# Patient Record
Sex: MALE | Race: WHITE | NOT HISPANIC OR LATINO | Employment: STUDENT | URBAN - METROPOLITAN AREA
[De-identification: names, ages, dates, MRNs, and addresses within clinical notes are randomized per-mention and may not be internally consistent; named-entity substitution may affect disease eponyms.]

---
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
## 2020-02-05 ENCOUNTER — COMMUNICATION - HEALTHEAST (OUTPATIENT)
Dept: TELEHEALTH | Facility: CLINIC | Age: 20
End: 2020-02-05

## 2020-02-05 ENCOUNTER — OFFICE VISIT - HEALTHEAST (OUTPATIENT)
Dept: FAMILY MEDICINE | Facility: CLINIC | Age: 20
End: 2020-02-05

## 2020-02-05 DIAGNOSIS — K13.79 MOUTH SORES: ICD-10-CM

## 2020-02-05 DIAGNOSIS — Z23 NEED FOR VACCINATION: ICD-10-CM

## 2020-02-05 RX ORDER — METHYLPHENIDATE HYDROCHLORIDE 30 MG/1
30 CAPSULE, EXTENDED RELEASE ORAL PRN
Status: SHIPPED | COMMUNITY
Start: 2019-05-20

## 2020-02-05 RX ORDER — METHYLPHENIDATE HYDROCHLORIDE 10 MG/1
10 TABLET ORAL PRN
Status: SHIPPED | COMMUNITY
Start: 2020-01-07 | End: 2021-07-15 | Stop reason: DRUGHIGH

## 2020-02-05 ASSESSMENT — MIFFLIN-ST. JEOR: SCORE: 1752.66

## 2020-02-18 ENCOUNTER — OFFICE VISIT - HEALTHEAST (OUTPATIENT)
Dept: FAMILY MEDICINE | Facility: CLINIC | Age: 20
End: 2020-02-18

## 2020-02-18 DIAGNOSIS — Z13.220 SCREENING FOR HYPERLIPIDEMIA: ICD-10-CM

## 2020-02-18 DIAGNOSIS — R63.4 RECENT UNINTENTIONAL WEIGHT LOSS OVER SEVERAL MONTHS: ICD-10-CM

## 2020-02-18 DIAGNOSIS — F90.9 ATTENTION DEFICIT HYPERACTIVITY DISORDER (ADHD), UNSPECIFIED ADHD TYPE: ICD-10-CM

## 2020-02-18 DIAGNOSIS — Z00.121 ENCOUNTER FOR ROUTINE CHILD HEALTH EXAMINATION WITH ABNORMAL FINDINGS: ICD-10-CM

## 2020-02-18 LAB
ALBUMIN SERPL-MCNC: 5 G/DL (ref 3.5–5)
ALP SERPL-CCNC: 122 U/L (ref 45–120)
ALT SERPL W P-5'-P-CCNC: 33 U/L (ref 0–45)
ANION GAP SERPL CALCULATED.3IONS-SCNC: 11 MMOL/L (ref 5–18)
AST SERPL W P-5'-P-CCNC: 23 U/L (ref 0–40)
BASOPHILS # BLD AUTO: 0.1 THOU/UL (ref 0–0.2)
BASOPHILS NFR BLD AUTO: 1 % (ref 0–2)
BILIRUB SERPL-MCNC: 0.8 MG/DL (ref 0–1)
BUN SERPL-MCNC: 18 MG/DL (ref 8–22)
CALCIUM SERPL-MCNC: 10.3 MG/DL (ref 8.5–10.5)
CHLORIDE BLD-SCNC: 104 MMOL/L (ref 98–107)
CHOLEST SERPL-MCNC: 129 MG/DL
CO2 SERPL-SCNC: 27 MMOL/L (ref 22–31)
CREAT SERPL-MCNC: 0.81 MG/DL (ref 0.7–1.3)
EOSINOPHIL # BLD AUTO: 0.2 THOU/UL (ref 0–0.4)
EOSINOPHIL NFR BLD AUTO: 3 % (ref 0–6)
ERYTHROCYTE [DISTWIDTH] IN BLOOD BY AUTOMATED COUNT: 12.1 % (ref 11–14.5)
FASTING STATUS PATIENT QL REPORTED: YES
GFR SERPL CREATININE-BSD FRML MDRD: >60 ML/MIN/1.73M2
GLUCOSE BLD-MCNC: 91 MG/DL (ref 70–125)
HCT VFR BLD AUTO: 43 % (ref 40–54)
HDLC SERPL-MCNC: 49 MG/DL
HGB BLD-MCNC: 14.9 G/DL (ref 14–18)
LDLC SERPL CALC-MCNC: 69 MG/DL
LYMPHOCYTES # BLD AUTO: 1.9 THOU/UL (ref 0.8–4.4)
LYMPHOCYTES NFR BLD AUTO: 20 % (ref 20–40)
MCH RBC QN AUTO: 31.6 PG (ref 27–34)
MCHC RBC AUTO-ENTMCNC: 34.7 G/DL (ref 32–36)
MCV RBC AUTO: 91 FL (ref 80–100)
MONOCYTES # BLD AUTO: 0.8 THOU/UL (ref 0–0.9)
MONOCYTES NFR BLD AUTO: 9 % (ref 2–10)
NEUTROPHILS # BLD AUTO: 6.2 THOU/UL (ref 2–7.7)
NEUTROPHILS NFR BLD AUTO: 67 % (ref 50–70)
PLATELET # BLD AUTO: 202 THOU/UL (ref 140–440)
PMV BLD AUTO: 9.8 FL (ref 8.5–12.5)
POTASSIUM BLD-SCNC: 5 MMOL/L (ref 3.5–5)
PROT SERPL-MCNC: 8.1 G/DL (ref 6–8)
RBC # BLD AUTO: 4.71 MILL/UL (ref 4.4–6.2)
SODIUM SERPL-SCNC: 142 MMOL/L (ref 136–145)
TRIGL SERPL-MCNC: 54 MG/DL
TSH SERPL DL<=0.005 MIU/L-ACNC: 1.11 UIU/ML (ref 0.3–5)
WBC: 9.2 THOU/UL (ref 4–11)

## 2020-02-18 ASSESSMENT — MIFFLIN-ST. JEOR: SCORE: 1743.82

## 2020-02-19 ENCOUNTER — COMMUNICATION - HEALTHEAST (OUTPATIENT)
Dept: FAMILY MEDICINE | Facility: CLINIC | Age: 20
End: 2020-02-19

## 2020-02-19 LAB — 25(OH)D3 SERPL-MCNC: 116 NG/ML (ref 30–80)

## 2020-05-18 ENCOUNTER — VIRTUAL VISIT (OUTPATIENT)
Dept: FAMILY MEDICINE | Facility: OTHER | Age: 20
End: 2020-05-18

## 2020-05-18 NOTE — PROGRESS NOTES
"Date: 2020 14:30:30  Clinician: Miguel Carson  Clinician NPI: [id number]  Patient: Garry Nourse  Patient : 2000  Patient Address: [facility] [address], [place] [address]  Patient Phone: [phone]  Visit Protocol: URI  Patient Summary:  Garry is a 19 year old ( : 2000 ) male who initiated a Visit for COVID-19 (Coronavirus) evaluation and screening. When asked the question \"Please sign me up to receive news, health information and promotions from [email or web address].\", Garry responded \"No\".    Garry states his symptoms started 1-2 days ago.   His symptoms consist of diarrhea, a sore throat, nasal congestion, rhinitis, a headache, and malaise. He is experiencing difficulty breathing due to nasal congestion but he is not short of breath.   Symptom details     Nasal secretions: The color of his mucus is clear.    Sore throat: Garry reports having mild throat pain (1-3 on a 10 point pain scale), does not have exudate on his tonsils, and can swallow liquids. He is not sure if the lymph nodes in his neck are enlarged. A rash has not appeared on the skin since the sore throat started.     Headache: He states the headache is mild (1-3 on a 10 point pain scale).      Garry denies having nausea, teeth pain, ageusia, facial pain or pressure, chills, wheezing, fever, cough, vomiting, ear pain, myalgias, and anosmia. He also denies having recent facial or sinus surgery in the past 60 days and taking antibiotic medication for the symptoms.   Precipitating events  Within the past week, Garry has not been exposed to someone with strep throat.   Pertinent COVID-19 (Coronavirus) information  In the past 14 days, Garry has not worked in a congregate living setting.   He does not work or volunteer as healthcare worker or a  and does not work or volunteer in a healthcare facility.   Garry also has not lived in a congregate living setting in the past 14 days. He does not live with a healthcare worker. "   Garry has had a close contact with a laboratory-confirmed COVID-19 patient within 14 days of symptom onset. Additional information about contact with COVID-19 (Coronavirus) patient as reported by the patient (free text): It was my mom who I saw and had contact with who was tested the next day and was positive   Pertinent medical history  Garry does not need a return to work/school note.   Weight: 155 lbs   Garry smokes or uses smokeless tobacco.   Weight: 155 lbs    MEDICATIONS: Intuniv ER oral, Ritalin oral, ALLERGIES: NKDA  Clinician Response:  Dear Garry,   Dear [patient_first_name  Your symptoms show that you may have coronavirus (COVID-19). This illness can cause fever, cough and trouble breathing. Many people get a mild case and get better on their own. Some people can get very sick.  Will I be tested for COVID-19?  Not all patients are tested for COVID-19. If you need to be tested, your care team will let you know. You may request testing if:   You are very ill. For example, you're on chemotherapy, dialysis or home hospice care. (Contact your specialty clinic or program.)   You live in a nursing home or other long-term care facility. (Talk to your nurse manager or medical director.)   You're a health care worker. (Contact your employee health office.)   How can I protect others?  Without a test, we can't know for sure that you have COVID-19. For safety, it's very important to follow these rules.  First, stay home and away from others (self-isolate) until:   You've had no fever---and no medicine that reduces fever---for 3 full days (72 hours). And...    Your other symptoms have gotten better. For example, your cough or breathing has improved. And...   At least 10 days have passed since your symptoms started.   During this time:   Stay in your own room (and use your own bathroom), if you can.   Stay away from others in your home. No hugging, kissing or shaking hands.   Don't let anyone visit.   Don't go  "to work, school or anywhere else.    Clean \"high touch\" surfaces often (doorknobs, counters, handles, etc.). Use a household cleaning spray or wipes.   Cover your mouth and nose with a mask, tissue or washcloth to avoid spreading germs.   Wash your hands and face often. Use soap and water.   How can I take care of myself?   1.Get lots of rest. Drink extra fluids (unless a doctor has told you not to).                  2.Take Tylenol (acetaminophen) for fever or pain. If you have liver or kidney problems, ask your family doctor if it's okay to take Tylenol.        Adults can take either:    650 mg (two 325 mg pills) every 4 to 6 hours, or...   1,000 mg (two 500 mg pills) every 8 hours as needed.    Note: Don't take more than 3,000 mg in one day. Acetaminophen is found in many medicines (both prescribed and over-the-counter medicines). Read all labels to be sure you don't take too much.    For children, check the Tylenol bottle for the right dose. The dose is based on the child's age or weight.   3.If you have other health problems (like cancer, heart failure, an organ transplant or severe kidney disease): Call your specialty clinic if you don't feel better in the next 2 days.       4.Know when to call 911: If your breathing is so bad that it keeps you from doing normal activities, call 911 or go to the emergency room. Tell them that you've been staying home and may have COVID-19.       5.Sign up for [employer]. We know it's scary to hear that you might have COVID-19. We want to track your symptoms to make sure you're okay over the next 2 weeks. Please look for an email from [employer]---this is a free, online program that we'll use to keep in touch. To sign up, follow the link in the email. Learn more at [email or web address].   Where can I get more information?  For more about COVID-19 and caring for yourself at home, please visit the CDC website at " https://www.cdc.gov/coronavirus/2019-ncov/about/steps-when-sick.html.  To learn about care at [facility], please visit [email or web address]         If you'd like to be part of a COVID-19 clinical trial (research study) at the [facility], go to [email or web address] for details.     Diagnosis: Acute upper respiratory infection, unspecified  Diagnosis ICD: J06.9

## 2020-06-23 ENCOUNTER — COMMUNICATION - HEALTHEAST (OUTPATIENT)
Dept: SCHEDULING | Facility: CLINIC | Age: 20
End: 2020-06-23

## 2020-06-23 DIAGNOSIS — Z20.822 COVID-19 RULED OUT: ICD-10-CM

## 2020-06-24 ENCOUNTER — AMBULATORY - HEALTHEAST (OUTPATIENT)
Dept: LAB | Facility: CLINIC | Age: 20
End: 2020-06-24

## 2020-06-24 DIAGNOSIS — Z20.822 COVID-19 RULED OUT: ICD-10-CM

## 2020-06-25 LAB — COVID-19 ANTIBODY IGG: NEGATIVE

## 2020-06-27 ENCOUNTER — COMMUNICATION - HEALTHEAST (OUTPATIENT)
Dept: SCHEDULING | Facility: CLINIC | Age: 20
End: 2020-06-27

## 2021-05-19 ENCOUNTER — OFFICE VISIT - HEALTHEAST (OUTPATIENT)
Dept: FAMILY MEDICINE | Facility: CLINIC | Age: 21
End: 2021-05-19

## 2021-05-19 DIAGNOSIS — Z11.1 SCREENING EXAMINATION FOR PULMONARY TUBERCULOSIS: ICD-10-CM

## 2021-05-19 ASSESSMENT — MIFFLIN-ST. JEOR: SCORE: 1831.31

## 2021-05-26 ENCOUNTER — COMMUNICATION - HEALTHEAST (OUTPATIENT)
Dept: SCHEDULING | Facility: CLINIC | Age: 21
End: 2021-05-26

## 2021-05-27 ENCOUNTER — COMMUNICATION - HEALTHEAST (OUTPATIENT)
Dept: ADMINISTRATIVE | Facility: CLINIC | Age: 21
End: 2021-05-27

## 2021-05-27 VITALS
WEIGHT: 169.19 LBS | RESPIRATION RATE: 16 BRPM | SYSTOLIC BLOOD PRESSURE: 112 MMHG | DIASTOLIC BLOOD PRESSURE: 64 MMHG | HEIGHT: 73 IN | BODY MASS INDEX: 22.42 KG/M2 | TEMPERATURE: 97.2 F | HEART RATE: 80 BPM

## 2021-05-28 ASSESSMENT — ASTHMA QUESTIONNAIRES: ACT_TOTALSCORE: 25

## 2021-05-29 ENCOUNTER — HEALTH MAINTENANCE LETTER (OUTPATIENT)
Age: 21
End: 2021-05-29

## 2021-05-31 VITALS — BODY MASS INDEX: 27.83 KG/M2 | WEIGHT: 210 LBS | HEIGHT: 73 IN

## 2021-06-04 VITALS
HEIGHT: 73 IN | TEMPERATURE: 98.3 F | DIASTOLIC BLOOD PRESSURE: 60 MMHG | BODY MASS INDEX: 19.87 KG/M2 | SYSTOLIC BLOOD PRESSURE: 132 MMHG | WEIGHT: 149.9 LBS | HEART RATE: 72 BPM

## 2021-06-04 VITALS
HEIGHT: 74 IN | HEART RATE: 76 BPM | RESPIRATION RATE: 16 BRPM | SYSTOLIC BLOOD PRESSURE: 138 MMHG | WEIGHT: 150.1 LBS | BODY MASS INDEX: 19.26 KG/M2 | DIASTOLIC BLOOD PRESSURE: 62 MMHG

## 2021-06-05 NOTE — PROGRESS NOTES
Assessment / Impression     1. Mouth sores     2. Need for vaccination  Influenza, Seasonal Quad, PF =/> 6months    HPV vaccine 9 valent 2 dose IM (if started before age 15)    Tdap vaccine,  8yo or older,  IM       Plan:     1.  Reviewed with patient that the sores appear to be healing.  We discussed techniques of distraction that continue to work for him such as chewing gum that he is going to try.  We discussed foods to avoid and over-the-counter remedies to help with discomfort.  If he has persistent symptoms he will contact his mental health provider to discuss cognitive behavioral therapies versus medication management if needed.  2.  Vaccines were updated today and he was encouraged to return for a well visit at his convenience.    Subjective:      HPI: Garry Redmond is a 19 y.o. male with open sores in his mouth is here today for evaluation.  This is an otherwise healthy 19-year-old male who reports he does have Tourette's syndrome for which she sees a mental health therapist at an outside health system.  He reports occasionally he will have episodes of lip biting and cheek biting.  He states it started last Thursday and he has had a couple of open sores in his mouth that he wanted evaluated to ensure they were not becoming infected.  He reports he has been trying to chew gum that has been helping with his symptoms.  He believes his lip biting is slowly improving and it has disappeared on its own after about a week or 2 in the past.  He denies any fevers or chills.  He does report some mouth discomfort but states that it is slowly improving as the biting is improving.  He has no other questions or concerns.  Of note patient does report that he had asthma as a young child and has had no symptoms in the past 2 decades.  He is not been on any medication or had any symptoms requiring any medical treatment for the past 2 decades.      Review of Systems  Pertinent items are noted in HPI.       Objective:     BP  "138/62 (Patient Site: Right Arm, Patient Position: Sitting, Cuff Size: Adult Regular)   Pulse 76   Resp 16   Ht 6' 1.5\" (1.867 m)   Wt 150 lb 1.6 oz (68.1 kg)   BMI 19.53 kg/m    Physical Examination: General appearance - alert, well appearing, and in no distress  Mouth: mucous membranes moist, patient has 2 small 3 mm sores on his upper oral cavities adjacent to his molars.  Both of them are showing no evidence of surrounding erythema or pus.  Patient also has two 3 mm, thickened, non-erythematous areas on his inner lip consistent with chronic biting of the inner lower lip.  Neck: supple, no significant adenopathy, no cervical adenopathy.  Chest: clear to auscultation, no wheezes, rales or rhonchi, symmetric air entry  Heart: normal rate, regular rhythm, normal S1, S2, no murmurs, rubs, clicks or gallops  Neurological: alert, oriented, normal speech, no focal findings or movement disorder noted.  No tics noted on exam today.  Psychiatric: Normal affect. Does not appear anxious or depressed.  "

## 2021-06-06 NOTE — PROGRESS NOTES
[facility] Well Child Check    ASSESSMENT & PLAN  Garry Redmond is a 19 y.o. who has normal growth and normal development.    Diagnoses and all orders for this visit:    Encounter for routine child health examination with abnormal findings    Recent unintentional weight loss over several months  -     Thyroid Cascade  -     Comprehensive Metabolic Panel  -     HM1(CBC and Differential)  -     Vitamin D, Total (25-Hydroxy)  -     HM1 (CBC with Diff)    Attention deficit hyperactivity disorder (ADHD), unspecified ADHD type    Screening for hyperlipidemia  -     Lipid Cascade FASTING      In general healthy well-child completed today however abnormal findings of unintentional weight loss I was able to go back and his documentation and see that he at one time was 210 pounds and has since lost about 60 pounds based on today's weight.  Unclear etiology of this as he overall has been feeling well and denies any weight loss efforts.  Could certainly be related to the Ritalin but his minimal use of this would not suggest this.  He only takes this 2 to 3 days/week he is followed by psychiatry every 6 months.  We will check the above labs to evaluate further.  Sent patient a my chart sign up for communication.  I encouraged him to keep a food journal to assess calories and calories out as well as activity.  Encouraged him to increase his intake of high-calorie healthy foods such as full fat yogurt, cottage cheese, eggs, avocados, and lean proteins.  He is in agreement this plan.    Return to clinic in 1 year for a Well Child Check or sooner as needed    IMMUNIZATIONS/LABS  No immunizations due today.  Hemoglobin: See results in chart.  Lipid Cascade: See results in chart.  see labs results in chart for workup of unintentional weight loss. .    REFERRALS  Dental:  Recommend routine dental care as appropriate.  Other:  No additional referrals were made at this time.    ANTICIPATORY GUIDANCE  I have reviewed age appropriate  anticipatory guidance.  Social:  Friends, Employment and Extracurricular Activities  Parenting:  Support and Confidential Health Care  Nutrition:  Junk Food, Dieting and Body Image  Play and Communication:  Hobbies, Creative Talents, Read Books and Media Violence Awareness  Health:  Self-image building, Drugs, Smoking, Alcohol and Self Testicular Exam  Safety:  Seat Belts  Sexuality:  Safe Sex and STD's    HEALTH HISTORY  Do you have any concerns that you'd like to discuss today?: weight loss and blood work request from mom   Has had about a 50lb weight loss over the last 18 months. He was off of ritalin for a summer and continued to lose weight. The weight loss was progressive. Eating patterns did not seem change. He was not skipping meals. Physical activity did not change. He typically snacks throughout the day and one meal per day.  Weight loss has been unintentional. For management of his mental health he sees a psychiatrist. At his last appointment it was about 6 months ago and has since lost about another 10lbs. He takes Ritalin 2-3 days per week.  11 months ago at his Psychiatrists office he weighed 176lbs.  Last documented WCC weight was on 6/14/17 weight was 210lbs.     He feels well and denies any symptoms of illness or feeling unwell.     Roomed by: [facility]    Refills needed? No    Do you have any forms that need to be filled out? No        Do you have any significant health concerns in your family history?: No  Family History   Problem Relation Age of Onset     Hypertension Mother      Diabetes Maternal Grandfather      Diabetes Paternal Grandmother      Diabetes Paternal Grandfather      Since your last visit, have there been any major changes in your family, such as a move, job change, separation, divorce, or death in the family?: No  Has a lack of transportation kept you from medical appointments?: No    Home  Who lives in your home?:  Dad dads girlfriend and GF'S daughter  Social History     Social  History Narrative     Not on file     Do you have any concerns about losing your housing?: No  Is your housing safe and comfortable?: Yes  Do you have any trouble with sleep?:  Yes:     Education  What school do you child attend?:  [employer]  What grade are you in?:  1st year college  How do you perform in school (grades, behavior, attention, homework?: Pt states going great loves it!     Eating  Do you eat regular meals including fruits and vegetables?:  no, 1 reg meal and snacks  What are you drinking (cow's milk, water, soda, juice, sports drinks, energy drinks, etc)?: water and soda  Have you been worried that you don't have enough food?: No  Do you have concerns about your body or appearance?:  No    Activities  Do you have friends?:  yes  Do you get at least one hour of physical activity per day?:  no  How many hours a day are you in front of a screen other than for schoolwork (computer, TV, phone)?:  A lot for school work ect  What do you do for exercise?:  nothing  Do you have interest/participate in community activities/volunteers/school sports?:  no    VISION/HEARING  Vision: Patient is already followed by a vision specialist  Hearing:  aged otu    No exam data present    MENTAL HEALTH SCREENING  Social-emotional & mental health screening: PHQ-2 Total Score: 0 (2/5/2020 10:16 AM)    No concerns    TB Risk Assessment:  The patient and/or parent/guardian answer positive to:  no known risk of TB    Dyslipidemia Risk Screening  Have either of your parents or any of your grandparents had a stroke or heart attack before age 55?: No  Any parents with high cholesterol or currently taking medications to treat?: No     Dental  When was the last time you saw the dentist?: 3-6 months ago   mid december    Patient Active Problem List   Diagnosis     Tourette's Syndrome     Keratosis Pilaris     Obsessive Compulsive Disorder     Acne     Attention deficit hyperactivity disorder (ADHD), unspecified ADHD type       Drugs  Does  "the patient use tobacco/alcohol/drugs?:  no    Safety  Does the patient have any safety concerns (peer or home)?:  no  Does the patient use safety belts, helmets and other safety equipment?:  yes    Sex  Have you ever had sex?:  Yes    MEASUREMENTS  Height:  6' 1\" (1.854 m)  Weight: 149 lb 14.4 oz (68 kg)  BMI: Body mass index is 19.78 kg/m .  Blood Pressure: 132/60  Blood pressure percentiles are not available for patients who are 18 years or older.    PHYSICAL EXAM  General: a/o x3, appears stated age, cooperative, and Interactive   Head: atraumatic and Normocephalic   Eyes: PERRL, EOMI and Red reflex bilaterally   ENT: Normal pearly TMs bilaterally and Oropharynx clear   Neck: Supple and Thyroid without enlargement or nodules   Chest: Chest wall normal   Lungs: Clear to auscultation bilaterally   Heart:: Regular rate and rhythm and no murmurs   Abdomen: Soft, nontender, nondistended and no hepatosplenomegaly   : declined exam   Spine: Inspection of the back is normal   Musculoskeletal: Full range of motion of the extremities and No tenderness in the extremities   Neuro: Cranial nerves 2-12 intact, Grossly normal and DTRs +2 bilaterally   Skin: No rashes or lesions noted               "

## 2021-06-09 NOTE — TELEPHONE ENCOUNTER
Mom with questions about testing. Discussed importance to keep abreast of information on CDC and MD website. She verbalized understanding.

## 2021-06-11 NOTE — PROGRESS NOTES
[facility] Well Child Check    ASSESSMENT & PLAN  Garry Redmond is a 16  y.o. 5  m.o. who has normal growth and normal development.    Diagnoses and all orders for this visit:    Visit for preventive health examination    Tourette's Syndrome  Has been seen a psychiatrist at [facility].  Other orders  -     HPV vaccine 9 valent 3 dose IM; Standing  -     Hepatitis A vaccine pediatric / adolescent 2 dose IM  -     HPV vaccine 9 valent 3 dose IM      Return to clinic in 1 year for a Well Child Check or sooner as needed    IMMUNIZATIONS/LABS  Immunizations were reviewed and orders were placed as appropriate. and I have discussed the risks and benefits of all of the vaccine components with the patient/parents.  All questions have been answered.    REFERRALS  Dental:  Recommend routine dental care as appropriate.  Other:  No additional referrals were made at this time.    ANTICIPATORY GUIDANCE  I have reviewed age appropriate anticipatory guidance.    HEALTH HISTORY  Do you have any concerns that you'd like to discuss today?: HPV - VERTIGO     ADHD/Tourette's : He is currently taking guanfacine for his ADHD and Tourette's syndrome prescribed by an [facility] provider. He notes that the guanfacine has improved his ticks.     Refills needed? No    Do you have any forms that need to be filled out? Yes        Do you have any significant health concerns in your family history?: No  Family History   Problem Relation Age of Onset     Hypertension Mother      Diabetes Maternal Grandfather      Diabetes Paternal Grandmother      Diabetes Paternal Grandfather      Since your last visit, have there been any major changes in your family, such as a move, job change, separation, divorce, or death in the family?: No    Home  Who lives in your home?:  MOM - DAD/GIRLFRIEND - 2 DOGS/1 CAT  Social History     Social History Narrative     Do you have any trouble with sleep?:  Yes: He is currently taking mirtazapine for insomnia. Mother notes  that the mirtazapine is improving his sleep quality.     Education  What school does your child attend?:  [employer]  What grade is your child in?:  11th  How does the patient perform in school (grades, behavior, attention, homework?: NO     Wants to become a novelist after high school.     Eating  Does patient eat regular meals including fruits and vegetables?:  yes  What is the patient drinking (cow's milk, water, soda, juice, sports drinks, energy drinks, etc)?: water, juice and sports drinks  Does patient have concerns about body or appearance?:  No    Activities  Does the patient have friends?:  yes  Does the patient get at least one hour of physical activity per day?:  no  Does the patient have less than 2 hours of screen time per day (aside from homework)?:  yes  What does your child do for exercise?:  WALKS  Does the patient have interest/participate in community activities/volunteers/school sports?:  no    MENTAL HEALTH SCREENING  PHQ-2 Total Score: 0 (6/14/2017 10:00 AM)  PHQ-2 Total Score: 0 (6/14/2017 10:00 AM)    VISION/HEARING  Vision: Completed. See Results  Hearing:  Completed. See Results     Hearing Screening    125Hz 250Hz 500Hz 1000Hz 2000Hz 3000Hz 4000Hz 6000Hz 8000Hz   Right ear:   20 20 20  20     Left ear:   20 20 20  20        Visual Acuity Screening    Right eye Left eye Both eyes   Without correction:      With correction: 10/8 10/8 10/8   Comments: Passed Farsightedness      TB Risk Assessment:  The patient and/or parent/guardian answer positive to:  patient and/or parent/guardian answer 'no' to all screening TB questions    Dental  Is your child being seen by a dentist?  Yes; every 6 months.       Patient Active Problem List   Diagnosis     Tourette's Syndrome     Keratosis Pilaris     Obsessive Compulsive Disorder     Reactive Airway Disease     Acne     Attention deficit hyperactivity disorder (ADHD), unspecified ADHD type       Drugs  Does the patient use tobacco/alcohol/drugs?:   "yes, 1-2 cigarettes a day; states he will quit soon.    Safety  Does the patient have any safety concerns (peer or home)?:  no  Does the patient use safety belts, helmets and other safety equipment?:  yes    Sex  Is the patient sexually active?:  yes, 3 partners in the past 2 months- condoms.     MEASUREMENTS  Height:  6' 1.3\" (1.862 m)  Weight: (!) 210 lb (95.3 kg)  BMI: Body mass index is 27.48 kg/(m^2).  Blood Pressure: 118/60  Blood pressure percentiles are 37 % systolic and 22 % diastolic based on NHBPEP's 4th Report. Blood pressure percentile targets: 90: 135/84, 95: 139/88, 99 + 5 mmH/101.    PHYSICAL EXAM  General: Alert, cooperative, no distress, appears stated age  Head: Normocephalic, without obvious abnormality, atraumatic  Eyes: PERRL, conjunctiva/corneas clear, EOM's intact, fundi benign, both eyes    Ears: Normal TM's and external ear canals, both ears  Nose: Nares normal, septum midline, mucosa normal, no drainage or sinus tenderness  Throat: Lips, mucosa, and tongue normal; teeth and gums normal  Neck: Supple, symmetrical, trachea midline, no adenopathy; Thyroid: no enlargement/tenderness/nodules; no carotid bruit or JVD  Lymph Nodes: Cervical, supraclavicular, and axillary nodes normal  Back: Symmetric, no curvature, ROM normal, no CVA tenderness  Lungs: Clear to auscultation bilaterally, respirations unlabored  Chest Wall: No tenderness or deformity  Heart: Regular rate and rhythm, S1 and S2 normal, no murmur, rub or gallop  Abdomen: Soft, non-tender, bowel sounds active all four quadrants, no masses, no organomegaly  Musculoskeletal: No limitation of range of motion, no joint tenderness  Extremities: Extremities normal, atraumatic, no cyanosis or edema  Pulses: 2+ and symmetric all extremities  Skin: Several nevi present over back.   Neurologic: CNII-XII intact. Normal strength, sensation and reflexes throughout  .  ADDITIONAL HISTORY SUMMARIZED (2): None.  DECISION TO OBTAIN EXTRA " INFORMATION (1): None.   RADIOLOGY TESTS (1): None.  LABS (1): None.  MEDICINE TESTS (1): None.  INDEPENDENT REVIEW (2 each): None.     The visit lasted a total of 20 minutes face to face with the patient. Over 50% of the time was spent counseling and educating the patient about physical exam.    IAgus, am scribing for and in the presence of, Dr. Vásquez.    I, Dr. Vásquez, personally performed the services described in this documentation, as scribed by Agus Thomas in my presence, and it is both accurate and complete.    Total Data Points:0

## 2021-06-17 NOTE — PROGRESS NOTES
"ASSESSMENT and plan   1. Screening examination for pulmonary tuberculosis    - UMF-Bmcqoqnqyhx-JK Gold Plus    There are no Patient Instructions on file for this visit.    Orders Placed This Encounter   Procedures     QFT-Quantiferon TB Gold Plus     QFT-Quantiferon TB Gold Plus Grey Tube     QFT-Quantiferon TB Gold Plus Green Tube     QFT-Quantieron TB Gold Yellow Tube     QFT-Quantiferon TB Gold Purple Tube     There are no discontinued medications.    No follow-ups on file.    CHIEF COMPLAINT:  Chief Complaint   Patient presents with     TB Testing       HISTORY OF PRESENT ILLNESS:  Garry is a 20 y.o. male   Is here to have TB testing done.  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  He has not reported any recent cough, chills, fever.  His weights been stable.  He has not had any night sweats no travel history is been noted    REVIEW OF SYSTEMS:     10 point review of  All other systems are negative.    PFSH:  Social history reviewed    TOBACCO USE:  Social History     Tobacco Use   Smoking Status Light Tobacco Smoker     Packs/day: 0.15     Years: 2.00     Pack years: 0.30     Types: Cigarettes     Last attempt to quit: 2019     Years since quittin.1   Smokeless Tobacco Never Used       VITALS:  Vitals:    21 0755   BP: 112/64   Pulse: 80   Resp: 16   Temp: 97.2  F (36.2  C)   TempSrc: Oral   Weight: 169 lb 3 oz (76.7 kg)   Height: 6' 1\" (1.854 m)     Wt Readings from Last 3 Encounters:   21 169 lb 3 oz (76.7 kg)   20 149 lb 14.4 oz (68 kg) (45 %, Z= -0.13)*   20 150 lb 1.6 oz (68.1 kg) (45 %, Z= -0.11)*     * Growth percentiles are based on CDC (Boys, 2-20 Years) data.       PHYSICAL EXAM:  Interactive male sitting comfortably in exam room no acute distress  Respiratory system clear to auscultation equal breath sounds no wheeze no crackles  CVS regular rate and rhythm no murmurs rubs gallops present  Lymphatic system no lymph enlargement noted neck no " supraclavicular lymph no enlargement noted    DATA REVIEWED:  Additional History from Old Records Summarized (2): 0  Decision to Obtain Records (1): 0  Radiology Tests Summarized or Ordered (1): 0  Labs Reviewed or Ordered (1): 0  Medicine Test Summarized or Ordered (1): 0  Independent Review of EKG or X-RAY(2 each):     The visit lasted a total of 20 minutes     MEDICATIONS:  Current Outpatient Medications   Medication Sig Dispense Refill     guanFACINE (INTUNIV ER) 3 mg Tb24 tablet Take 3 mg by mouth daily.       methylphenidate HCl (RITALIN LA) 30 MG 24 hr capsule Take 30 mg by mouth as needed.       methylphenidate HCl (RITALIN) 10 MG tablet Take 10 mg by mouth as needed.       No current facility-administered medications for this visit.

## 2021-06-18 NOTE — PATIENT INSTRUCTIONS - HE
Patient Instructions by Shayy Haynes CNP at 2/18/2020  1:40 PM     Author: Shayy Haynes CNP Service: -- Author Type: Nurse Practitioner    Filed: 2/18/2020  1:58 PM Encounter Date: 2/18/2020 Status: Signed    : Shayy Haynes CNP (Nurse Practitioner)          Patient Education      BRIGHT [facility] HANDOUT- PATIENT  18 THROUGH 21 YEAR VISITS  Here are some suggestions from [employer] experts that may be of value to your family.     HOW YOU ARE DOING  Enjoy spending time with your family.  Find activities you are really interested in, such as sports, theater, or volunteering.  Try to be responsible for your schoolwork or work obligations.  Always talk through problems and never use violence.  If you get angry with someone, try to walk away.  If you feel unsafe in your home or have been hurt by someone, let us know. Hotlines and community agencies can also provide confidential help.  Talk with us if you are worried about your living or food situation. Community agencies and programs such as SNAP can help.  Dont smoke, vape, or use drugs. Avoid people who do when you can. Talk with us if you are worried about alcohol or drug use in your family.    YOUR DAILY LIFE  Visit the dentist at least twice a year.  Brush your teeth at least twice a day and floss once a day.  Be a healthy eater.  Have vegetables, fruits, lean protein, and whole grains at meals and snacks.  Limit fatty, sugary, salty foods that are low in nutrients, such as candy, chips, and ice cream.  Eat when youre hungry. Stop when you feel satisfied.  Eat breakfast.  Drink plenty of water.  Make sure to get enough calcium every day.  Have 3 or more servings of low-fat (1%) or fat-free milk and other low-fat dairy products, such as yogurt and cheese.  Women: Make sure to eat foods rich in folate, such as fortified grains and dark- green leafy vegetables.  Aim for at least 1 hour of physical activity every day.  Wear safety  equipment when you play sports.  Get enough sleep.  Talk with us about managing your health care and insurance as an adult.    YOUR FEELINGS  Most people have ups and downs. If you are feeling sad, depressed, nervous, irritable, hopeless, or angry, let us know or reach out to another health care professional.  Figure out healthy ways to deal with stress.  Try your best to solve problems and make decisions on your own.  Sexuality is an important part of your life. If you have any questions or concerns, we are here for you.    HEALTHY BEHAVIOR CHOICES  Avoid using drugs, alcohol, tobacco, steroids, and diet pills. Support friends who choose not to use.  If you use drugs or alcohol, let us know or talk with another trusted adult about it. We can help you with quitting or cutting down on your use.  Make healthy decisions about your sexual behavior.  If you are sexually active, always practice safe sex. Always use birth control along with a condom to prevent pregnancy and sexually transmitted infections.  All sexual activity should be something you want. No one should ever force or try to convince you.  Protect your hearing at work, home, and concerts. Keep your earbud volume down.    STAYING SAFE  Always be a safe and cautious .  Insist that everyone use a lap and shoulder seat belt.  Limit the number of friends in the car and avoid driving at night.  Avoid distractions. Never text or talk on the phone while you drive.  Do not ride in a vehicle with someone who has been using drugs or alcohol.  If you feel unsafe driving or riding with someone, call someone you trust to drive you.  Wear helmets and protective gear while playing sports. Wear a helmet when riding a bike, a motorcycle, or an ATV or when skiing or skateboarding.  Always use sunscreen and a hat when youre outside.  Fighting and carrying weapons can be dangerous. Talk with your parents, teachers, or doctor about how to avoid these  situations.      Helpful Resources:  National Domestic Violence Hotline: 965.246.9878   Consistent with Bright Futures: Guidelines for Health Supervision of Infants, Children, and Adolescents, 4th Edition  For more information, go to https://brightfutures.aap.org.

## 2021-06-20 NOTE — LETTER
Letter by Shayy Haynes CNP at      Author: Shayy Haynes CNP Service: -- Author Type: --    Filed:  Encounter Date: 2/19/2020 Status: (Other)         Garry Redmond  [address]  [place] [address]             February 19, 2020         Dear Mr. Redmond,    Below are the results from your recent visit:    Resulted Orders   Thyroid Cascade   Result Value Ref Range    TSH 1.11 0.30 - 5.00 uIU/mL   Comprehensive Metabolic Panel   Result Value Ref Range    Sodium 142 136 - 145 mmol/L    Potassium 5.0 3.5 - 5.0 mmol/L    Chloride 104 98 - 107 mmol/L    CO2 27 22 - 31 mmol/L    Anion Gap, Calculation 11 5 - 18 mmol/L    Glucose 91 70 - 125 mg/dL    BUN 18 8 - 22 mg/dL    Creatinine 0.81 0.70 - 1.30 mg/dL    GFR MDRD Af Amer >60 >60 mL/min/1.73m2    GFR MDRD Non Af Amer >60 >60 mL/min/1.73m2    Bilirubin, Total 0.8 0.0 - 1.0 mg/dL    Calcium 10.3 8.5 - 10.5 mg/dL    Protein, Total 8.1 (H) 6.0 - 8.0 g/dL    Albumin 5.0 3.5 - 5.0 g/dL    Alkaline Phosphatase 122 (H) 45 - 120 U/L    AST 23 0 - 40 U/L    ALT 33 0 - 45 U/L    Narrative    Fasting Glucose reference range is 70-99 mg/dL per  American Diabetes Association (ADA) guidelines.   Vitamin D, Total (25-Hydroxy)   Result Value Ref Range    Vitamin D, Total (25-Hydroxy) 116.0 (HH) 30.0 - 80.0 ng/mL    Narrative    Deficiency <10.0 ng/mL  Insufficiency 10.0-29.9 ng/mL  Sufficiency 30.0-80.0 ng/mL  Toxicity (possible) >100.0 ng/mL   HM1 (CBC with Diff)   Result Value Ref Range    WBC 9.2 4.0 - 11.0 thou/uL    RBC 4.71 4.40 - 6.20 mill/uL    Hemoglobin 14.9 14.0 - 18.0 g/dL    Hematocrit 43.0 40.0 - 54.0 %    MCV 91 80 - 100 fL    MCH 31.6 27.0 - 34.0 pg    MCHC 34.7 32.0 - 36.0 g/dL    RDW 12.1 11.0 - 14.5 %    Platelets 202 140 - 440 thou/uL    MPV 9.8 8.5 - 12.5 fL    Neutrophils % 67 50 - 70 %    Lymphocytes % 20 20 - 40 %    Monocytes % 9 2 - 10 %    Eosinophils % 3 0 - 6 %    Basophils % 1 0 - 2 %    Neutrophils Absolute 6.2 2.0 - 7.7 thou/uL     Lymphocytes Absolute 1.9 0.8 - 4.4 thou/uL    Monocytes Absolute 0.8 0.0 - 0.9 thou/uL    Eosinophils Absolute 0.2 0.0 - 0.4 thou/uL    Basophils Absolute 0.1 0.0 - 0.2 thou/uL   Lipid Cascade FASTING   Result Value Ref Range    Cholesterol 129 <=199 mg/dL    Triglycerides 54 <=149 mg/dL    HDL Cholesterol 49 >=40 mg/dL    LDL Calculated 69 <=129 mg/dL    Patient Fasting > 8hrs? Yes        Labs in general are normal except for a very high vitamin D. I recommend reducing supplementation on this to reduce to a normal level. No signs or abnormalities to suggest cause of weight loss. Keep a food journal. If weight loss is still occurring we could consider further evaluation by GI.     Please call with questions or contact us using [employer].    Sincerely,        Electronically signed by Shayy Haynes, CNP

## 2021-06-23 ENCOUNTER — COMMUNICATION - HEALTHEAST (OUTPATIENT)
Dept: SCHEDULING | Facility: CLINIC | Age: 21
End: 2021-06-23

## 2021-06-25 NOTE — TELEPHONE ENCOUNTER
Chart reviewed. Letter created. E-mailed to patient as requested at [email or web address]. James notified.

## 2021-06-25 NOTE — TELEPHONE ENCOUNTER
Reason for Call:  Other Letter for Employer regarding TB Results     Detailed comments: Mom James called on behalf of pt regarding getting a letter about his TB results. This is for his employer and needs to be in letterhead form. They will not accept a copy of the results. They were told by care team that they'll send him a letter and copy of results via email but pt has yet to receive and this was suppose to be done last week. They did have trouble signing up with [employer] and they had actually set up with [employer] instead. I gave her instructions on how to sign up for [employer] so they can view visits/results/send messages. James said care team did not explain this to them at the time of visit and was just told they'll get something via email. She was pretty frustrated how this all went and very appreciative that TC was able to help. James would like this to be done today and a call back from care team with confirmation. Letter needs to be emailed to pt and copy mailed.    Phone Number Patient can be reached at: Other phone number:  [phone]    Best Time: anytime    Can we leave a detailed message on this number?: Yes    Call taken on 5/27/2021 at 8:41 AM by Brandan Packer

## 2021-06-25 NOTE — TELEPHONE ENCOUNTER
Pt's mom called requesting pt's negative TB results for work to be emailed to the pt , mom was transferred  to [employer] for further assistance at [phone], and she was told to have pt set up my chart account and call back then results will be sent in that way today as soon as the my chart is set up. She was informed the medical records cant be emailed. Mom was given direction on how pt to set up the my chart account and phone number was given to call back.    Jason Hatfield RN  [facility]      Additional Information    Health Information question, no triage required and triager able to answer question    Protocols used: INFORMATION ONLY CALL-A-AH
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
## 2021-07-04 NOTE — LETTER
Letter by Drew Guerra MD at      Author: Drew Guerra MD Service: -- Author Type: --    Filed:  Encounter Date: 5/27/2021 Status: (Other)         Garry V Nyla  [address]  [place] [address]             May 27, 2021         Dear  Monicakendy,    Below are the results from your recent visit:    TB (tuberculosis blood test) is negative/normal.     Component      Latest Ref Rng & Units 5/19/2021   Quantiferon-TB Gold Plus      Negative Negative   TB1 Ag minus Nil Value      IU/mL -0.02   TB2 Ag minus Nil Value      IU/mL 0.03   Mitogen minus Nil Result      IU/mL 9.93   Nil Result      IU/mL 0.07       Please call with questions or contact us using [employer].    Sincerely,        Electronically signed by Drew Guerra MD

## 2021-07-15 ENCOUNTER — OFFICE VISIT (OUTPATIENT)
Dept: FAMILY MEDICINE | Facility: CLINIC | Age: 21
End: 2021-07-15
Payer: COMMERCIAL

## 2021-07-15 VITALS
OXYGEN SATURATION: 97 % | HEIGHT: 73 IN | SYSTOLIC BLOOD PRESSURE: 120 MMHG | BODY MASS INDEX: 22.4 KG/M2 | TEMPERATURE: 98 F | RESPIRATION RATE: 20 BRPM | HEART RATE: 77 BPM | DIASTOLIC BLOOD PRESSURE: 62 MMHG | WEIGHT: 169.06 LBS

## 2021-07-15 DIAGNOSIS — Z00.00 ROUTINE HISTORY AND PHYSICAL EXAMINATION OF ADULT: Primary | ICD-10-CM

## 2021-07-15 PROCEDURE — 99395 PREV VISIT EST AGE 18-39: CPT | Performed by: FAMILY MEDICINE

## 2021-07-15 ASSESSMENT — MIFFLIN-ST. JEOR: SCORE: 1830.74

## 2021-07-15 NOTE — PATIENT INSTRUCTIONS
Pleasure to see you in the office today seem to be doing very well I would ask you to try to develop an exercise program if you have a normal sleep-wake cycle he can always reach me by my chart messages if you have any acute concerns or questions
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
## 2021-07-16 NOTE — PROGRESS NOTES
SUBJECTIVE:   CC: Garry Redmond is an 20 year old male who presents for preventative health visit.       Patient has been advised of split billing requirements and indicates understanding: Yes  HPI  20-year-old male here for annual physical.  He is not fasting today.  He reports he has no acute concerns or questions regarding his health      Today's PHQ-2 Score:   PHQ-2 (  [employer]) 7/15/2021   Q1: Little interest or pleasure in doing things 0   Q2: Feeling down, depressed or hopeless 0   PHQ-2 Score 0   Q1: Little interest or pleasure in doing things Not at all   Q2: Feeling down, depressed or hopeless Not at all   PHQ-2 Score 0       Abuse: Current or Past(Physical, Sexual or Emotional)- No  Do you feel safe in your environment? Yes    Have you ever done Advance Care Planning? (For example, a Health Directive, POLST, or a discussion with a medical provider or your loved ones about your wishes): No, advance care planning information given to patient to review.  Patient declined advance care planning discussion at this time.    Social History     Tobacco Use     Smoking status: Light Tobacco Smoker     Packs/day: 0.15     Years: 2.00     Pack years: 0.30     Types: Cigarettes     Last attempt to quit: 2019     Years since quittin.2     Smokeless tobacco: Never Used   Substance Use Topics     Alcohol use: Never         Alcohol Use 7/15/2021   Prescreen: >3 drinks/day or >7 drinks/week? No       Last PSA: No results found for: PSA    Reviewed orders with patient. Reviewed health maintenance and updated orders accordingly - Yes      Reviewed and updated as needed this visit by clinical staff  Tobacco  Allergies  Meds              Reviewed and updated as needed this visit by Provider                    Review of Systems  CONSTITUTIONAL: NEGATIVE for fever, chills, change in weight  INTEGUMENTARY/SKIN: NEGATIVE for worrisome rashes, moles or lesions  EYES: NEGATIVE for vision changes or irritation  ENT:  "NEGATIVE for ear, mouth and throat problems  RESP: NEGATIVE for significant cough or SOB  CV: NEGATIVE for chest pain, palpitations or peripheral edema  GI: NEGATIVE for nausea, abdominal pain, heartburn, or change in bowel habits   male: negative for dysuria, hematuria, decreased urinary stream, erectile dysfunction, urethral discharge  MUSCULOSKELETAL: NEGATIVE for significant arthralgias or myalgia  NEURO: NEGATIVE for weakness, dizziness or paresthesias  PSYCHIATRIC: NEGATIVE for changes in mood or affect    OBJECTIVE:   /62   Pulse 77   Temp 98  F (36.7  C) (Oral)   Resp 20   Ht 1.854 m (6' 1\")   Wt 76.7 kg (169 lb 1 oz)   SpO2 97%   BMI 22.31 kg/m      Physical Exam  GENERAL: healthy, alert and no distress  EYES: Eyes grossly normal to inspection, PERRL and conjunctivae and sclerae normal  HENT: ear canals and TM's normal, nose and mouth without ulcers or lesions  NECK: no adenopathy, no asymmetry, masses, or scars and thyroid normal to palpation  RESP: lungs clear to auscultation - no rales, rhonchi or wheezes  CV: regular rate and rhythm, normal S1 S2, no S3 or S4, no murmur, click or rub, no peripheral edema and peripheral pulses strong  ABDOMEN: soft, nontender, no hepatosplenomegaly, no masses and bowel sounds normal  MS: no gross musculoskeletal defects noted, no edema  SKIN: no suspicious lesions or rashes  NEURO: Normal strength and tone, mentation intact and speech normal  PSYCH: mentation appears normal, affect normal/bright    Diagnostic Test Results:  Labs reviewed in Epic    ASSESSMENT/PLAN:       ICD-10-CM    1. Routine history and physical examination of adult  Z00.00        Patient has been advised of split billing requirements and indicates understanding: Yes  COUNSELING:   Reviewed preventive health counseling, as reflected in patient instructions       Regular exercise       Healthy diet/nutrition    Estimated body mass index is 22.31 kg/m  as calculated from the following:    " "Height as of this encounter: 1.854 m (6' 1\").    Weight as of this encounter: 76.7 kg (169 lb 1 oz).         He reports that he has been smoking cigarettes. He has a 0.30 pack-year smoking history. He has never used smokeless tobacco.  Tobacco Cessation Action Plan:   Information offered: Patient not interested at this time      Counseling Resources:  ATP IV Guidelines  Pooled Cohorts Equation Calculator  FRAX Risk Assessment  ICSI Preventive Guidelines  Dietary Guidelines for Americans, 2010  USDA's MyPlate  ASA Prophylaxis  Lung CA Screening    Drew Guerra MD  [facility]  "

## 2021-09-18 ENCOUNTER — HEALTH MAINTENANCE LETTER (OUTPATIENT)
Age: 21
End: 2021-09-18

## 2021-11-19 ENCOUNTER — TELEPHONE (OUTPATIENT)
Dept: NURSING | Facility: CLINIC | Age: 21
End: 2021-11-19
Payer: COMMERCIAL

## 2021-11-19 NOTE — TELEPHONE ENCOUNTER
Telephone call  Mother calling to report sonteresa had a incident at work that he got stuck with some dirty metal and wanted to get the patient a appointment for  A tetnus vaccine if he needed one since he would be getting off his shift soon.  Last booster  2/5/2020 no booster needed.    Melisa Islas RN   [facility] Nurse Advisor  7:13 AM 11/19/2021

## 2022-08-20 ENCOUNTER — HEALTH MAINTENANCE LETTER (OUTPATIENT)
Age: 22
End: 2022-08-20

## 2022-11-20 ENCOUNTER — HEALTH MAINTENANCE LETTER (OUTPATIENT)
Age: 22
End: 2022-11-20

## 2023-09-10 ENCOUNTER — HEALTH MAINTENANCE LETTER (OUTPATIENT)
Age: 23
End: 2023-09-10

## 2024-10-19 ENCOUNTER — HOSPITAL ENCOUNTER (EMERGENCY)
Facility: CLINIC | Age: 24
Discharge: HOME | End: 2024-10-19
Attending: EMERGENCY MEDICINE | Admitting: EMERGENCY MEDICINE
Payer: COMMERCIAL

## 2024-10-19 VITALS
DIASTOLIC BLOOD PRESSURE: 84 MMHG | BODY MASS INDEX: 21.82 KG/M2 | SYSTOLIC BLOOD PRESSURE: 135 MMHG | OXYGEN SATURATION: 100 % | HEART RATE: 110 BPM | RESPIRATION RATE: 20 BRPM | WEIGHT: 170 LBS | TEMPERATURE: 98 F | HEIGHT: 74 IN

## 2024-10-19 DIAGNOSIS — R11.10 VOMITING AND DIARRHEA: ICD-10-CM

## 2024-10-19 DIAGNOSIS — R19.7 VOMITING AND DIARRHEA: ICD-10-CM

## 2024-10-19 LAB
ALBUMIN SERPL BCG-MCNC: 5.4 G/DL (ref 3.5–5.2)
ALP SERPL-CCNC: 92 U/L (ref 40–150)
ALT SERPL W P-5'-P-CCNC: 22 U/L (ref 0–70)
ANION GAP SERPL CALCULATED.3IONS-SCNC: 18 MMOL/L (ref 7–15)
AST SERPL W P-5'-P-CCNC: 25 U/L (ref 0–45)
BASE EXCESS BLDV CALC-SCNC: -3 MMOL/L (ref -3–3)
BASOPHILS # BLD AUTO: 0 10E3/UL (ref 0–0.2)
BASOPHILS NFR BLD AUTO: 0 %
BILIRUB SERPL-MCNC: 1 MG/DL
BUN SERPL-MCNC: 26.4 MG/DL (ref 6–20)
CA-I BLD-MCNC: 4.6 MG/DL (ref 4.4–5.2)
CALCIUM SERPL-MCNC: 10 MG/DL (ref 8.8–10.4)
CHLORIDE SERPL-SCNC: 102 MMOL/L (ref 98–107)
CPB POCT: NO
CREAT BLD-MCNC: 1 MG/DL (ref 0.7–1.3)
CREAT SERPL-MCNC: 0.96 MG/DL (ref 0.67–1.17)
EGFRCR SERPLBLD CKD-EPI 2021: >60 ML/MIN/1.73M2
EGFRCR SERPLBLD CKD-EPI 2021: >90 ML/MIN/1.73M2
EOSINOPHIL # BLD AUTO: 0 10E3/UL (ref 0–0.7)
EOSINOPHIL NFR BLD AUTO: 0 %
ERYTHROCYTE [DISTWIDTH] IN BLOOD BY AUTOMATED COUNT: 11.9 % (ref 10–15)
GLUCOSE BLD-MCNC: 99 MG/DL (ref 70–99)
GLUCOSE SERPL-MCNC: 97 MG/DL (ref 70–99)
HCO3 BLDV-SCNC: 20 MMOL/L (ref 21–28)
HCO3 SERPL-SCNC: 19 MMOL/L (ref 22–29)
HCT VFR BLD AUTO: 44.5 % (ref 40–53)
HCT VFR BLD CALC: 48 % (ref 40–53)
HGB BLD-MCNC: 16.1 G/DL (ref 13.3–17.7)
HGB BLD-MCNC: 16.3 G/DL (ref 13.3–17.7)
IMM GRANULOCYTES # BLD: 0 10E3/UL
IMM GRANULOCYTES NFR BLD: 0 %
LYMPHOCYTES # BLD AUTO: 0.6 10E3/UL (ref 0.8–5.3)
LYMPHOCYTES NFR BLD AUTO: 4 %
MCH RBC QN AUTO: 32 PG (ref 26.5–33)
MCHC RBC AUTO-ENTMCNC: 36.2 G/DL (ref 31.5–36.5)
MCV RBC AUTO: 89 FL (ref 78–100)
MONOCYTES # BLD AUTO: 0.6 10E3/UL (ref 0–1.3)
MONOCYTES NFR BLD AUTO: 4 %
NEUTROPHILS # BLD AUTO: 12.9 10E3/UL (ref 1.6–8.3)
NEUTROPHILS NFR BLD AUTO: 91 %
NRBC # BLD AUTO: 0 10E3/UL
NRBC BLD AUTO-RTO: 0 /100
PCO2 BLDV: 30 MM HG (ref 40–50)
PH BLDV: 7.44 [PH] (ref 7.32–7.43)
PLATELET # BLD AUTO: 181 10E3/UL (ref 150–450)
PO2 BLDV: 26 MM HG (ref 25–47)
POTASSIUM BLD-SCNC: 3.2 MMOL/L (ref 3.4–5.3)
POTASSIUM SERPL-SCNC: 3.3 MMOL/L (ref 3.4–5.3)
PROT SERPL-MCNC: 8.5 G/DL (ref 6.4–8.3)
RBC # BLD AUTO: 5.03 10E6/UL (ref 4.4–5.9)
SAO2 % BLDV: 52 % (ref 70–75)
SODIUM BLD-SCNC: 141 MMOL/L (ref 135–145)
SODIUM SERPL-SCNC: 139 MMOL/L (ref 135–145)
WBC # BLD AUTO: 14.2 10E3/UL (ref 4–11)

## 2024-10-19 PROCEDURE — 85025 COMPLETE CBC W/AUTO DIFF WBC: CPT | Performed by: EMERGENCY MEDICINE

## 2024-10-19 PROCEDURE — 96361 HYDRATE IV INFUSION ADD-ON: CPT | Performed by: EMERGENCY MEDICINE

## 2024-10-19 PROCEDURE — 36415 COLL VENOUS BLD VENIPUNCTURE: CPT | Performed by: EMERGENCY MEDICINE

## 2024-10-19 PROCEDURE — 96375 TX/PRO/DX INJ NEW DRUG ADDON: CPT | Performed by: EMERGENCY MEDICINE

## 2024-10-19 PROCEDURE — 250N000011 HC RX IP 250 OP 636: Performed by: EMERGENCY MEDICINE

## 2024-10-19 PROCEDURE — 82947 ASSAY GLUCOSE BLOOD QUANT: CPT | Performed by: EMERGENCY MEDICINE

## 2024-10-19 PROCEDURE — 93010 ELECTROCARDIOGRAM REPORT: CPT | Performed by: EMERGENCY MEDICINE

## 2024-10-19 PROCEDURE — 96374 THER/PROPH/DIAG INJ IV PUSH: CPT | Performed by: EMERGENCY MEDICINE

## 2024-10-19 PROCEDURE — 99284 EMERGENCY DEPT VISIT MOD MDM: CPT | Performed by: EMERGENCY MEDICINE

## 2024-10-19 PROCEDURE — 93005 ELECTROCARDIOGRAM TRACING: CPT | Performed by: EMERGENCY MEDICINE

## 2024-10-19 PROCEDURE — 258N000003 HC RX IP 258 OP 636: Performed by: EMERGENCY MEDICINE

## 2024-10-19 PROCEDURE — 99284 EMERGENCY DEPT VISIT MOD MDM: CPT | Mod: 25 | Performed by: EMERGENCY MEDICINE

## 2024-10-19 PROCEDURE — 82803 BLOOD GASES ANY COMBINATION: CPT

## 2024-10-19 PROCEDURE — 82565 ASSAY OF CREATININE: CPT

## 2024-10-19 RX ORDER — DIPHENHYDRAMINE HYDROCHLORIDE 50 MG/ML
25 INJECTION INTRAMUSCULAR; INTRAVENOUS ONCE
Status: COMPLETED | OUTPATIENT
Start: 2024-10-19 | End: 2024-10-19

## 2024-10-19 RX ORDER — POTASSIUM CHLORIDE 7.45 MG/ML
10 INJECTION INTRAVENOUS ONCE
Status: COMPLETED | OUTPATIENT
Start: 2024-10-19 | End: 2024-10-19

## 2024-10-19 RX ADMIN — POTASSIUM CHLORIDE 10 MEQ: 7.46 INJECTION, SOLUTION INTRAVENOUS at 21:50

## 2024-10-19 RX ADMIN — SODIUM CHLORIDE 1000 ML: 9 INJECTION, SOLUTION INTRAVENOUS at 20:05

## 2024-10-19 RX ADMIN — DIPHENHYDRAMINE HYDROCHLORIDE 25 MG: 50 INJECTION, SOLUTION INTRAMUSCULAR; INTRAVENOUS at 20:00

## 2024-10-19 RX ADMIN — PROCHLORPERAZINE EDISYLATE 10 MG: 5 INJECTION INTRAMUSCULAR; INTRAVENOUS at 20:01

## 2024-10-19 ASSESSMENT — COLUMBIA-SUICIDE SEVERITY RATING SCALE - C-SSRS
6. HAVE YOU EVER DONE ANYTHING, STARTED TO DO ANYTHING, OR PREPARED TO DO ANYTHING TO END YOUR LIFE?: NO
1. IN THE PAST MONTH, HAVE YOU WISHED YOU WERE DEAD OR WISHED YOU COULD GO TO SLEEP AND NOT WAKE UP?: NO
2. HAVE YOU ACTUALLY HAD ANY THOUGHTS OF KILLING YOURSELF IN THE PAST MONTH?: NO

## 2024-10-19 ASSESSMENT — ACTIVITIES OF DAILY LIVING (ADL)
ADLS_ACUITY_SCORE: 35

## 2024-10-20 LAB
ATRIAL RATE - MUSE: 103 BPM
DIASTOLIC BLOOD PRESSURE - MUSE: NORMAL MMHG
INTERPRETATION ECG - MUSE: NORMAL
P AXIS - MUSE: 53 DEGREES
PR INTERVAL - MUSE: 126 MS
QRS DURATION - MUSE: 88 MS
QT - MUSE: 340 MS
QTC - MUSE: 445 MS
R AXIS - MUSE: 86 DEGREES
SYSTOLIC BLOOD PRESSURE - MUSE: NORMAL MMHG
T AXIS - MUSE: 66 DEGREES
VENTRICULAR RATE- MUSE: 103 BPM

## 2024-10-20 NOTE — ED NOTES
Patient was hooked to iv Potassium, after few minutes of infusing, patient complains of burning sensation. RN put a secondary IV to decrease pain sensation but patient insist on taking the IV out and wanted to leave. RN explained to patient the need for potassium replacement but patient claims he feels better and doesn't want the replacement. MD was informed. Pt requested his Peripheral IV taken out

## 2024-10-20 NOTE — DISCHARGE INSTRUCTIONS
You have been seen in the emergency department today for vomiting and diarrhea.  This can be due to a viral infection, or sometimes it can be due to food poisoning.  We have checked basic labs on you here today and we have advised that you received some potassium and fluids in the IV which you have declined.  After medications you were able to tolerate oral intake and did not have any further vomiting.  You have asked to be discharged.    Please continue a clear liquid diet today, this would include things like chicken broth, Pedialyte/Gatorade, or other electrolyte drinks, and as your system tolerates you can slowly advance your diet back to normal.  Return to the emergency department for new or worsening symptoms.

## 2024-10-20 NOTE — ED TRIAGE NOTES
"  \" I have food poisoning all started today\"   Triage Assessment (Adult)       Row Name 10/19/24 1918          Triage Assessment    Airway WDL WDL        Respiratory WDL    Respiratory WDL WDL        Skin Circulation/Temperature WDL    Skin Circulation/Temperature WDL WDL        Cardiac WDL    Cardiac WDL WDL        Peripheral/Neurovascular WDL    Peripheral Neurovascular WDL WDL        Cognitive/Neuro/Behavioral WDL    Cognitive/Neuro/Behavioral WDL WDL                     "

## 2024-10-20 NOTE — ED PROVIDER NOTES
"ED Provider Note  [facility]      History     Chief Complaint   Patient presents with    Nausea, Vomiting, & Diarrhea     \" I have food poisoning all started today\"     HPI    Garry Redmond is a 23 year old male with a history of ADHD, OCD, Tourette's syndrome, who presents to the emergency department today complaining of nausea, vomiting, and diarrhea.  Patient reports symptoms started suddenly about 1-1.5 hours prior to evaluation.  He has had about 3 episodes of nonbloody emesis, and 2 episodes of loose watery diarrhea, no hematochezia, no melena.  He feels very anxious, and states \"my whole body feels terrible \".  He denies any fevers but he has felt chilled.  No nasal congestion or sore throat.  No cough, shortness of breath, or chest pain.  He denies any abdominal pain, but states that he has torso pain from vomiting.    Patient denies any recent undercooked food or food that has been left out or known bad food exposure.  He ate a burger at a restaurant today, and had half of a [employer] pizza yesterday.  Nobody else at home has similar symptoms, but nobody ate the same food that he ate.    Patient is clearly distressed and hyperventilating, very irritable, not overly cooperative with answering questions or cooperating with exam.  He is accompanied by his father today.    Past Medical History:   Diagnosis Date    ADHD (attention deficit hyperactivity disorder), combined type     History of OCD (obsessive compulsive disorder)     Tourette's syndrome        No past surgical history on file.    Family History   Problem Relation Age of Onset    Hypertension Mother     Diabetes Maternal Grandfather     Diabetes Paternal Grandmother     Diabetes Paternal Grandfather        Social History     Tobacco Use    Smoking status: Light Smoker     Current packs/day: 0.00     Average packs/day: 0.2 packs/day for 2.0 years (0.3 ttl pk-yrs)     Types: Cigarettes     Start date: 4/5/2017     Last " "attempt to quit: 2019     Years since quittin.5    Smokeless tobacco: Never   Substance Use Topics    Alcohol use: Never         Past Medical History  Past Medical History:   Diagnosis Date    ADHD (attention deficit hyperactivity disorder), combined type     History of OCD (obsessive compulsive disorder)     Tourette's syndrome      No past surgical history on file.  guanFACINE (INTUNIV ER) 3 mg Tb24 tablet  methylphenidate HCl (RITALIN LA) 30 MG 24 hr capsule      Allergies   Allergen Reactions    Poliomyelitis Vaccine, Inactivated Swelling     At the injection site    Red Dye Swelling     Facial     Family History  Family History   Problem Relation Age of Onset    Hypertension Mother     Diabetes Maternal Grandfather     Diabetes Paternal Grandmother     Diabetes Paternal Grandfather      Social History   Social History     Tobacco Use    Smoking status: Light Smoker     Current packs/day: 0.00     Average packs/day: 0.2 packs/day for 2.0 years (0.3 ttl pk-yrs)     Types: Cigarettes     Start date: 2017     Last attempt to quit: 2019     Years since quittin.5    Smokeless tobacco: Never   Substance Use Topics    Alcohol use: Never    Drug use: Not Currently     Comment: Drug use: history of marijuaua use. Stopped as of 2020.  Use was daily for symptoms of insomnia.       A medically appropriate review of systems was performed with pertinent positives and negatives noted in the HPI, and all other systems negative.    Physical Exam   BP: 135/84  Pulse: 110  Temp: 98  F (36.7  C)  Resp: 20  Height: 188 cm (6' 2\")  Weight: 77.1 kg (170 lb)  SpO2: 100 %  Physical Exam  Vitals and nursing note reviewed.   Constitutional:       General: He is in acute distress.      Appearance: He is not diaphoretic.      Comments: Adult male, sitting up in bed, multiple jackets on, obviously hyperventilating, very anxious.   HENT:      Head: Atraumatic.      Mouth/Throat:      Mouth: Mucous membranes are moist.    "   Pharynx: Oropharynx is clear. No oropharyngeal exudate.   Eyes:      General: No scleral icterus.     Pupils: Pupils are equal, round, and reactive to light.   Cardiovascular:      Rate and Rhythm: Normal rate.      Pulses: Normal pulses.      Heart sounds: No murmur heard.  Pulmonary:      Effort: No respiratory distress.      Breath sounds: Normal breath sounds.   Abdominal:      Palpations: Abdomen is soft.      Tenderness: There is no abdominal tenderness.      Comments: Abdomen is flat, patient has tension of the muscles of abdominal wall to light palpation but denies pain.  Hypoactive bowel sounds.   Musculoskeletal:         General: No tenderness.   Skin:     General: Skin is warm.      Findings: No rash.   Neurological:      General: No focal deficit present.   Psychiatric:      Comments: Highly anxious, hyperventilating, irritable.         ED Course, Procedures, & Data      Procedures              EKG Interpretation:      Interpreted by Anastasia Ferrer MD  Time reviewed:2010   Symptoms at time of EKG: None   Rhythm: Sinus tachycardia  Rate: 100-110  Axis: Normal  Ectopy: None  Conduction: Normal  ST Segments/ T Waves: No ST-T wave changes and No acute ischemic changes  Q Waves: None  Comparison to prior: No old EKG available    Clinical Impression: sinus tachycardia            Results for orders placed or performed during the hospital encounter of 10/19/24   Comprehensive metabolic panel     Status: Abnormal   Result Value Ref Range    Sodium 139 135 - 145 mmol/L    Potassium 3.3 (L) 3.4 - 5.3 mmol/L    Carbon Dioxide (CO2) 19 (L) 22 - 29 mmol/L    Anion Gap 18 (H) 7 - 15 mmol/L    Urea Nitrogen 26.4 (H) 6.0 - 20.0 mg/dL    Creatinine 0.96 0.67 - 1.17 mg/dL    GFR Estimate >90 >60 mL/min/1.73m2    Calcium 10.0 8.8 - 10.4 mg/dL    Chloride 102 98 - 107 mmol/L    Glucose 97 70 - 99 mg/dL    Alkaline Phosphatase 92 40 - 150 U/L    AST 25 0 - 45 U/L    ALT 22 0 - 70 U/L    Protein Total 8.5 (H) 6.4 - 8.3  g/dL    Albumin 5.4 (H) 3.5 - 5.2 g/dL    Bilirubin Total 1.0 <=1.2 mg/dL   Creatinine POCT     Status: Normal   Result Value Ref Range    Creatinine POCT 1.0 0.7 - 1.3 mg/dL    GFR, ESTIMATED POCT >60 >60 mL/min/1.73m2   CBC with platelets and differential     Status: Abnormal   Result Value Ref Range    WBC Count 14.2 (H) 4.0 - 11.0 10e3/uL    RBC Count 5.03 4.40 - 5.90 10e6/uL    Hemoglobin 16.1 13.3 - 17.7 g/dL    Hematocrit 44.5 40.0 - 53.0 %    MCV 89 78 - 100 fL    MCH 32.0 26.5 - 33.0 pg    MCHC 36.2 31.5 - 36.5 g/dL    RDW 11.9 10.0 - 15.0 %    Platelet Count 181 150 - 450 10e3/uL    % Neutrophils 91 %    % Lymphocytes 4 %    % Monocytes 4 %    % Eosinophils 0 %    % Basophils 0 %    % Immature Granulocytes 0 %    NRBCs per 100 WBC 0 <1 /100    Absolute Neutrophils 12.9 (H) 1.6 - 8.3 10e3/uL    Absolute Lymphocytes 0.6 (L) 0.8 - 5.3 10e3/uL    Absolute Monocytes 0.6 0.0 - 1.3 10e3/uL    Absolute Eosinophils 0.0 0.0 - 0.7 10e3/uL    Absolute Basophils 0.0 0.0 - 0.2 10e3/uL    Absolute Immature Granulocytes 0.0 <=0.4 10e3/uL    Absolute NRBCs 0.0 10e3/uL   iStat Gases Electrolytes ICA Glucose Venous, POCT     Status: Abnormal   Result Value Ref Range    CPB Applied No     Hematocrit POCT 48 40 - 53 %    Calcium, Ionized Whole Blood POCT 4.6 4.4 - 5.2 mg/dL    Glucose Whole Blood POCT 99 70 - 99 mg/dL    Bicarbonate Venous POCT 20 (L) 21 - 28 mmol/L    Hemoglobin POCT 16.3 13.3 - 17.7 g/dL    Potassium POCT 3.2 (L) 3.4 - 5.3 mmol/L    Sodium POCT 141 135 - 145 mmol/L    pCO2 Venous POCT 30 (L) 40 - 50 mm Hg    pO2 Venous POCT 26 25 - 47 mm Hg    pH Venous POCT 7.44 (H) 7.32 - 7.43    O2 Sat, Venous POCT 52 (L) 70 - 75 %    Base Excess/Deficit (+/-) POCT -3.0 -3.0 - 3.0 mmol/L   EKG 12 lead     Status: None (Preliminary result)   Result Value Ref Range    Systolic Blood Pressure  mmHg    Diastolic Blood Pressure  mmHg    Ventricular Rate 103 BPM    Atrial Rate 103 BPM    TN Interval 126 ms    QRS Duration 88 ms      ms    QTc 445 ms    P Axis 53 degrees    R AXIS 86 degrees    T Axis 66 degrees    Interpretation ECG       Sinus tachycardia  Nonspecific T wave abnormality  Abnormal ECG     CBC with Platelets & Differential     Status: Abnormal    Narrative    The following orders were created for panel order CBC with Platelets & Differential.  Procedure                               Abnormality         Status                     ---------                               -----------         ------                     CBC with platelets and d...[[id number]]  Abnormal            Final result                 Please view results for these tests on the individual orders.     Medications   sodium chloride 0.9% BOLUS 1,000 mL (0 mLs Intravenous Stopped 10/19/24 2145)   prochlorperazine (COMPAZINE) injection 10 mg (10 mg Intravenous $Given 10/19/24 2001)   diphenhydrAMINE (BENADRYL) injection 25 mg (25 mg Intravenous $Given 10/2000)   potassium chloride 10 mEq in 100 mL sterile water infusion (0 mEq Intravenous Stopped 10/19/24 2202)     Labs Ordered and Resulted from Time of ED Arrival to Time of ED Departure   COMPREHENSIVE METABOLIC PANEL - Abnormal       Result Value    Sodium 139      Potassium 3.3 (*)     Carbon Dioxide (CO2) 19 (*)     Anion Gap 18 (*)     Urea Nitrogen 26.4 (*)     Creatinine 0.96      GFR Estimate >90      Calcium 10.0      Chloride 102      Glucose 97      Alkaline Phosphatase 92      AST 25      ALT 22      Protein Total 8.5 (*)     Albumin 5.4 (*)     Bilirubin Total 1.0     CBC WITH PLATELETS AND DIFFERENTIAL - Abnormal    WBC Count 14.2 (*)     RBC Count 5.03      Hemoglobin 16.1      Hematocrit 44.5      MCV 89      MCH 32.0      MCHC 36.2      RDW 11.9      Platelet Count 181      % Neutrophils 91      % Lymphocytes 4      % Monocytes 4      % Eosinophils 0      % Basophils 0      % Immature Granulocytes 0      NRBCs per 100 WBC 0      Absolute Neutrophils 12.9 (*)     Absolute Lymphocytes  0.6 (*)     Absolute Monocytes 0.6      Absolute Eosinophils 0.0      Absolute Basophils 0.0      Absolute Immature Granulocytes 0.0      Absolute NRBCs 0.0     ISTAT GASES ELECTROLYTES ICA GLUCOSE VENOUS POCT - Abnormal    CPB Applied No      Hematocrit POCT 48      Calcium, Ionized Whole Blood POCT 4.6      Glucose Whole Blood POCT 99      Bicarbonate Venous POCT 20 (*)     Hemoglobin POCT 16.3      Potassium POCT 3.2 (*)     Sodium POCT 141      pCO2 Venous POCT 30 (*)     pO2 Venous POCT 26      pH Venous POCT 7.44 (*)     O2 Sat, Venous POCT 52 (*)     Base Excess/Deficit (+/-) POCT -3.0     ISTAT CREATININE POCT - Normal    Creatinine POCT 1.0      GFR, ESTIMATED POCT >60     ISTAT CREATININE POCT     No orders to display          Critical care was not performed.     Medical Decision Making  The patient's presentation was of moderate complexity (an acute illness with systemic symptoms).    The patient's evaluation involved:  review of external note(s) from 2 sources (see separate area of note for details)  ordering and/or review of 3+ test(s) in this encounter (see separate area of note for details)    The patient's management necessitated moderate risk (prescription drug management including medications given in the ED).    Assessment & Plan    Patient presents to the Emergency Department today for the above complaints.  On initial evaluation he is hyperventilating, appears quite distressed.  Differential diagnosis of vomiting and diarrhea could include any sort of viral etiology which is possible, foodborne etiology would also be possible.  Doubt acute intra-abdominal pathology at this point without abdominal pain.    We did establish IV access and we did draw blood for laboratory analysis.  Due to the fact that the chemistry analyzer machine is currently down, we did i-STAT chemistries.  Patient was given IV fluids, Compazine, and Benadryl here in the emergency department for vomiting.      EKG shows sinus  tachycardia with a rate of 103, no sign of ectopy or ischemia.  Basic I-STAT labs were checked as well as some formal labs.  I-STAT creatinine is 1.0, I-STAT electrolytes show a bicarb of 20, pCO2 of 30, pH of 7.44, hemoglobin of 16.3.  CBC shows mild leukocytosis with a white count of 14.2, suspect stress leukocytosis due to vomiting.  Patient is afebrile.  Comprehensive metabolic panel shows mild hypokalemia with potassium of 3.3, mild acidosis with a bicarb of 19, anion gap is 18, creatinine of 0.96 and normal glucose.    Patient was given Compazine and Benadryl here in the Emergency Department, we did advise IV fluids and IV potassium.  Patient started to accept these therapies and then immediately became agitated and said he needed to leave right now.  He was able to tolerate an oral challenge, with crackers and water, when I went to speak with him he had literally disconnected the IV and was about to walk out the door.  I had a discussion with him and his father where I explained we would recommend the following therapies and he continues to decline and wants to be discharged immediately.  Patient will be discharged and he should start a clear liquid diet, advance as tolerated, return to the ED for new or worsening symptoms.    I have reviewed the nursing notes. I have reviewed the findings, diagnosis, plan and need for follow up with the patient.    Discharge Medication List as of 10/19/2024 10:35 PM          Final diagnoses:   Vomiting and diarrhea   IRita, am serving as a trained medical scribe to document services personally performed by Anastasia Ferrer MD based on the provider's statements to me on October 19, 2024.  This document has been checked and approved by the attending provider.    IAnastasia MD, was physically present and have reviewed and verified the accuracy of this note documented by Rita Tate, medical scribe.        Anastasia Ferrer MD  [facility]  [facility] EMERGENCY DEPARTMENT  10/19/2024     Anastasia Ferrer MD  10/20/24 0048

## 2024-11-03 ENCOUNTER — HEALTH MAINTENANCE LETTER (OUTPATIENT)
Age: 24
End: 2024-11-03